# Patient Record
Sex: MALE | Race: WHITE | URBAN - METROPOLITAN AREA
[De-identification: names, ages, dates, MRNs, and addresses within clinical notes are randomized per-mention and may not be internally consistent; named-entity substitution may affect disease eponyms.]

---

## 2018-03-02 DIAGNOSIS — Z13.6 SCREENING FOR CARDIOVASCULAR CONDITION: Primary | ICD-10-CM

## 2018-03-05 ENCOUNTER — OFFICE VISIT (OUTPATIENT)
Dept: CARDIOLOGY | Facility: CLINIC | Age: 63
End: 2018-03-05
Payer: COMMERCIAL

## 2018-03-05 VITALS
HEART RATE: 63 BPM | HEIGHT: 68 IN | BODY MASS INDEX: 28.49 KG/M2 | DIASTOLIC BLOOD PRESSURE: 77 MMHG | SYSTOLIC BLOOD PRESSURE: 176 MMHG | WEIGHT: 188 LBS | OXYGEN SATURATION: 99 %

## 2018-03-05 DIAGNOSIS — I10 ESSENTIAL HYPERTENSION: Primary | ICD-10-CM

## 2018-03-05 DIAGNOSIS — Z82.49 FAMILY HISTORY OF CORONARY ARTERIOSCLEROSIS: ICD-10-CM

## 2018-03-05 DIAGNOSIS — I65.23 ATHEROSCLEROSIS OF BOTH CAROTID ARTERIES: ICD-10-CM

## 2018-03-05 DIAGNOSIS — Z13.6 SCREENING FOR CARDIOVASCULAR CONDITION: ICD-10-CM

## 2018-03-05 LAB
BUN SERPL-MCNC: 19 MG/DL (ref 7–30)
CHOLEST SERPL-MCNC: 170 MG/DL
CREAT SERPL-MCNC: 0.89 MG/DL (ref 0.66–1.25)
CREAT UR-MCNC: 252 MG/DL
FEF 25/75: NORMAL
FEV-1: NORMAL
FEV1/FVC: NORMAL
FVC: NORMAL
GFR SERPL CREATININE-BSD FRML MDRD: 86 ML/MIN/1.7M2
GLUCOSE SERPL-MCNC: 99 MG/DL (ref 70–99)
HDLC SERPL-MCNC: 54 MG/DL
LDLC SERPL CALC-MCNC: 99 MG/DL
MICROALBUMIN UR-MCNC: 38 MG/L
MICROALBUMIN/CREAT UR: 15.08 MG/G CR (ref 0–17)
NONHDLC SERPL-MCNC: 116 MG/DL
NT-PROBNP SERPL-MCNC: 87 PG/ML (ref 0–125)
TRIGL SERPL-MCNC: 84 MG/DL

## 2018-03-05 NOTE — LETTER
3/5/2018      RE: Bashir Virk  [address] [facility] [address]       Dear Colleague,    Thank you for the opportunity to participate in the care of your patient, Bashir Virk, at the [facility] FOR CARDIOVASCULAR DISEASE PREVENTION at [facility]. Please see a copy of my visit note below.    [facility] for Cardiovascular Disease Prevention - Exam Note    Active Problems   Patient Active Problem List    Diagnosis Date Noted     Elevated blood pressure reading      Priority: Medium       Reason For Visit   Patient here for [facility] early detection of atherosclerosis and CVD exam.    Pain Evaluation  Current history of pain associated with this visit is: denied    HPI   Bashir Virk is a 62 year old year old male with a history of isolated elevated blood pressures in the 140's systolic and family history of coronary disease with his sister age 65 with stent placement and father age 67 wit sudden cardiac death. He also has many relatives that have lived into their 90's. He has not seen a doctor in many years but has checked his blood pressure at drug stores with elevated readings in the 140's.  He feels well with no chest pain or shortness of breath. He does have mild lower leg edema (sock ring) and varicose veins. History of renal calculi and he thinks he passes a stone now and then but they have never been tested. He drinks more liquids if he thinks he is passing a stone (back pain).     Nutrition assessment per patient report:   Foods with fat/cholesterol (fried foods, fatty meats, junk food):  2 servings per week  Red meat, not much fried or fast food.  Fruits and vegetables (  cup cooked, 1 cup raw):2-  3 servings per day , two fruits some veges.  Caffeine (1 cup coffee, soda, etc):  4-6 coffee, light creamer, stevia  Alcohol servings (12 oz. beer, 4 oz. wine, 1  oz. in mixed drink): 1- 2 servings per week  Calcium servings (dairy foods, 8  oz. milk, yogurt, cheese, ice cream):  2 servings per day  Salt/sodium use:  light use  Special dietary habits:  None    Activity  Patient is active recently with  times per week for 30 or more minutes with walking. Since he has recently been walking regularly he has noticed less of an increase in his heart rate with exertion. He also does yard work like mowing,  In the summer.    Laboratory Results Review  We discussed laboratory results today including lipids targets and how foods influence cholesterol.    Weight  His perceived healthy weight is 173 pounds.  A normal BMI of 25 is equal to 164 pounds.  The current BMI of 28.5 is overweight range.  A weight reduction speed of 2-3 lbs per month for men is recommended.    PMH   Past Medical History:   Diagnosis Date     Elbow injury 1996    both arms     Elevated blood pressure reading      H/O tinnitus      Kidney stone 2008     Varicose veins of left lower extremity        PSH  Past Surgical History:   Procedure Laterality Date     HAND SURGERY  1977    reconstruction surgery after crushing injury       Current Meds   No current outpatient prescriptions on file.       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 His job is [employer].  He is  with one son age 21 one daughter age 24..     Enjoyment of life is 8 with 10 enjoys life fully.    Tobacco History  History   Smoking Status     Not on file   Smokeless Tobacco     Not on file       ROS  CONSTITUTIONAL:  No fever, chills, or sweats. No weight gain/loss.   EENT:  No visual disturbance, ear ache, epistaxis, sore throat  ALLERGIES/IMMUNOLOGIC:   "Negative  RESPIRATORY:  No cough, hemoptysis  CARDIOVASCULAR:  As per HPI  GI:  No nausea, vomiting, hematemesis, melena  :  No urinary frequency, dysuria, or hematuria  INTEGUMENT:  Negative  PSYCHIATRIC:  Negative  NEURO:  Negative  ENDOCRINE:  Negative  MUSCULOSKELETAL:  Negative  MUSCULOSKELETAL:  some hand pain from old injury.     Vital Signs   /77 (BP Location: Left arm, Patient Position: Sitting, Cuff Size: Adult Regular)  Pulse 63  Ht 1.727 m (5' 8\")  Wt 85.3 kg (188 lb)  SpO2 99%  BMI 28.59 kg/m2      Waist: 38.5 inches  Hip: 41 inches    Physical Exam   In general, the patient is a pleasant male in no apparent distress.    HEENT: NC/AT.  PERRLA.  EOMI.  Sclerae white, not injected.  Nares clear.  Pharynx without erythema or exudate.  Dentition intact.    Neck: No adenopathy.  No thyromegaly.Carotids +4/4 bilaterally with right sided bruits.  No jugular venous distension.   Lungs: CTA.  No ronchi, wheezes, rales.     Cor: RRR. Normal S1, S2 splits physiologically. Grade 2/6 systolic murmur RICS ? Radiation to right carotid or carotid bruit, no rub, click, or gallop. The PMI is in the 5th ICS in the midclavicular line. There is no heave.   Abdomen: Soft, nontender, nondistended. No organomegaly.  No bruits.   Extremities: No clubbing, cyanosis, mild lower leg edema. The pulses are +2/2 at the post-tibial sites bilaterally.     Recent Labs  Lab Results   Component Value Date    GLC 99 03/05/2018      Lab Results   Component Value Date    NTBNP 87 03/05/2018     No results found for: NTBNPI   Lab Results   Component Value Date    UCRR 252 03/05/2018      Lab Results   Component Value Date    MICROL 38 03/05/2018      No results found for: MICROALBUMIN   No results found for: CRP   Lab Results   Component Value Date    CHOL 170 03/05/2018      Lab Results   Component Value Date    TRIG 84 03/05/2018      Lab Results   Component Value Date    HDL 54 03/05/2018      Lab Results   Component Value " Date    LDL 99 03/05/2018      No results found for: VLDL   No results found for: CHOLHDLRATIO  Lab Results   Component Value Date    NHDL 116 03/05/2018          Shannon Test Results    BASIC SPIROMETRY: Summary of two attempts (see printout for details of results)  Results Estimated range for ht/age   FVC: 4.16 liter FVC: 3.60-5.41 liter   FEV1: 3.59 liter FEV1: 2.62-4.15 liter     History of asthma:  NO   History of respiratory infection current/recent:  NO     Spirometry Results:  normal      WALKING BLOOD PRESSURE RESPONSE (3 minute, 5 MET level walk)   Pre BP: 140/76 mmHg  3 min BP: 220/60 mmHg  1 min post BP: 180/70 mmHg    Pre HR: 70 bpm  3 min HR: 140 bpm  1 min post HR: 100 bpm       RETINAL VASCULAR ASSESSMENT   Left Eye Abnormality:  siver wiring  AV Ratio: 0.60    Right Eye Abnormality:  siver wiring  AV Ratio: 0.58     Retinal Assessment:  abnormal      ABDOMINAL AORTA ULTRASOUND (< 2.5 normal, borderline 2.5-2.9, abnormal > 3)   SupraIliac 2.15 cm    SupraRenal 2.08 cm    InfraRenal Proximal 2.12 cm    InfraRenal Distal 2.04 cm      Abdominal Aorta Assessment:  normal      LEFT VENTRICULAR ULTRASOUND MEASUREMENTS (adjusted for BSA)  LVIDD 41.0 mm   Septa 13.4 mm   Posterior 15.2 mm     Left Ventricular US Assessment:  abnormal      Carotid Artery IMT measurements report and plaques in the small area examined:   Left IMT 0.889 mm  Plaques multiple plaques in CCA and bulb area size 3.0 mm - 1.00 mm     Right IMT 1.084 mm  Plaques multiple plaques in CCA and bulb area size 3.0 mm - 1.00 mm       ECG (see tracing):  normal sinus rhythm;  rate: 60 bpm      Arterial Elasticity per age and gender (see printout):   C1 11.4 mL/mmHg x 10  normal   C2 2.4 mL/mmHg x 100 abnormal   Supine blood pressure: 157/81 mmHg       Assessment:   11.4  Cardiovascular:  ECG sinus rhythm rate ___. No chest pain or shortness of breath, palpitations or dizziness. He has mild lower leg edema. Grade 2/6 systolic murmur and right  sided carotid bruit.  Nt pro BNP is normal range, no report of sleep apnea. We discussed possible recommendation for low dose aspirin, he does have some tinnitus so will follow up with recommendation based on his test results. We discussed obtaining a CAC scan with family history of CAD in the 60's to screen for advanced disease.     Blood Pressure:  Elevated resting blood pressures today from 150-170's/ 70-80's. He takes no medications and has recorded elevated out of clinic blood pressures with drug store devices.    Lipids: HDL 54, LDL 99, trig 84 without elevation. If he has significant carotid plaque or reduced compliance may still recommend statin to treat disease even with optimal numbers depending on team review of data.    Health Habit Summary:  Nutrition: Heart Healthy Eating:  some of the time   Exercise:  regularly active  Weight:  overweight range  Tobacco Use:  20 pack year history quit in 1991    Full report to follow prevention team review of test results with scanned final report.    Time spent for patient visit was 60 minutes with more than half the time spent on counseling and coordination of care.    YOANNA Santillan CNP       CC  Patient Care Team:  No Ref-Primary, Physician as PCP - General  Nini Bonilla APRN CNP as Nurse Practitioner (Nurse Practitioner)  SELF, REFERRED    Answers for HPI/ROS submitted by the patient on 3/5/2018   General Symptoms: No  Skin Symptoms: No  HENT Symptoms: No  EYE SYMPTOMS: No  HEART SYMPTOMS: No  LUNG SYMPTOMS: No  INTESTINAL SYMPTOMS: No  URINARY SYMPTOMS: No  REPRODUCTIVE SYMPTOMS: No  SKELETAL SYMPTOMS: No  BLOOD SYMPTOMS: No  NERVOUS SYSTEM SYMPTOMS: No  MENTAL HEALTH SYMPTOMS: No      Please do not hesitate to contact me if you have any questions/concerns.     Sincerely,     YOANNA Santillan CNP

## 2018-03-05 NOTE — PROGRESS NOTES
[facility] for Cardiovascular Disease Prevention - Exam Note    Active Problems   Patient Active Problem List    Diagnosis Date Noted     Elevated blood pressure reading      Priority: Medium       Reason For Visit   Patient here for [facility] early detection of atherosclerosis and CVD exam.    Pain Evaluation  Current history of pain associated with this visit is: denied    HPI   Bashir Virk is a 62 year old year old male with a history of isolated elevated blood pressures in the 140's systolic and family history of coronary disease with his sister age 65 with stent placement and father age 67 wit sudden cardiac death. He also has many relatives that have lived into their 90's. He has not seen a doctor in many years but has checked his blood pressure at drug stores with elevated readings in the 140's.  He feels well with no chest pain or shortness of breath. He does have mild lower leg edema (sock ring) and varicose veins. History of renal calculi and he thinks he passes a stone now and then but they have never been tested. He drinks more liquids if he thinks he is passing a stone (back pain).     Nutrition assessment per patient report:   Foods with fat/cholesterol (fried foods, fatty meats, junk food):  2 servings per week  Red meat, not much fried or fast food.  Fruits and vegetables (  cup cooked, 1 cup raw):2-  3 servings per day , two fruits some veges.  Caffeine (1 cup coffee, soda, etc):  4-6 coffee, light creamer, stevia  Alcohol servings (12 oz. beer, 4 oz. wine, 1  oz. in mixed drink): 1- 2 servings per week  Calcium servings (dairy foods, 8 oz. milk, yogurt, cheese, ice cream):  2 servings per day  Salt/sodium use:  light use  Special dietary habits:  None    Activity  Patient is active recently with  times per week for 30 or more minutes with walking. Since he has recently been walking regularly he has noticed less of an increase in his heart rate with exertion. He also does yard work like  mowing,  In the summer.    Laboratory Results Review  We discussed laboratory results today including lipids targets and how foods influence cholesterol.    Weight  His perceived healthy weight is 173 pounds.  A normal BMI of 25 is equal to 164 pounds.  The current BMI of 28.5 is overweight range.  A weight reduction speed of 2-3 lbs per month for men is recommended.    PMH   Past Medical History:   Diagnosis Date     Elbow injury 1996    both arms     Elevated blood pressure reading      H/O tinnitus      Kidney stone 2008     Varicose veins of left lower extremity        PSH  Past Surgical History:   Procedure Laterality Date     HAND SURGERY  1977    reconstruction surgery after crushing injury       Current Meds   No current outpatient prescriptions on file.       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  He is  with one son age 21 one daughter age 24..     Enjoyment of life is 8 with 10 enjoys life fully.    Tobacco History  History   Smoking Status     Not on file   Smokeless Tobacco     Not on file       ROS  CONSTITUTIONAL:  No fever, chills, or sweats. No weight gain/loss.   EENT:  No visual disturbance, ear ache, epistaxis, sore throat  ALLERGIES/IMMUNOLOGIC:  Negative  RESPIRATORY:  No cough, hemoptysis  CARDIOVASCULAR:  As per HPI  GI:  No nausea, vomiting, hematemesis, melena  :  No urinary frequency, dysuria, or hematuria  INTEGUMENT:  Negative  PSYCHIATRIC:  Negative  NEURO:  Negative  ENDOCRINE:  Negative  MUSCULOSKELETAL:  Negative  MUSCULOSKELETAL:  some hand pain from old injury.     Vital Signs   /77 (BP  "Location: Left arm, Patient Position: Sitting, Cuff Size: Adult Regular)  Pulse 63  Ht 1.727 m (5' 8\")  Wt 85.3 kg (188 lb)  SpO2 99%  BMI 28.59 kg/m2      Waist: 38.5 inches  Hip: 41 inches    Physical Exam   In general, the patient is a pleasant male in no apparent distress.    HEENT: NC/AT.  PERRLA.  EOMI.  Sclerae white, not injected.  Nares clear.  Pharynx without erythema or exudate.  Dentition intact.    Neck: No adenopathy.  No thyromegaly.Carotids +4/4 bilaterally with right sided bruits.  No jugular venous distension.   Lungs: CTA.  No ronchi, wheezes, rales.     Cor: RRR. Normal S1, S2 splits physiologically. Grade 2/6 systolic murmur RICS ? Radiation to right carotid or carotid bruit, no rub, click, or gallop. The PMI is in the 5th ICS in the midclavicular line. There is no heave.   Abdomen: Soft, nontender, nondistended. No organomegaly.  No bruits.   Extremities: No clubbing, cyanosis, mild lower leg edema. The pulses are +2/2 at the post-tibial sites bilaterally.     Recent Labs  Lab Results   Component Value Date    GLC 99 03/05/2018      Lab Results   Component Value Date    NTBNP 87 03/05/2018     No results found for: NTBNPI   Lab Results   Component Value Date    UCRR 252 03/05/2018      Lab Results   Component Value Date    MICROL 38 03/05/2018      No results found for: MICROALBUMIN   No results found for: CRP   Lab Results   Component Value Date    CHOL 170 03/05/2018      Lab Results   Component Value Date    TRIG 84 03/05/2018      Lab Results   Component Value Date    HDL 54 03/05/2018      Lab Results   Component Value Date    LDL 99 03/05/2018      No results found for: VLDL   No results found for: CHOLHDLRATIO  Lab Results   Component Value Date    NHDL 116 03/05/2018          Shannon Test Results    BASIC SPIROMETRY: Summary of two attempts (see printout for details of results)  Results Estimated range for ht/age   FVC: 4.16 liter FVC: 3.60-5.41 liter   FEV1: 3.59 liter FEV1: " 2.62-4.15 liter     History of asthma:  NO   History of respiratory infection current/recent:  NO     Spirometry Results:  normal      WALKING BLOOD PRESSURE RESPONSE (3 minute, 5 MET level walk)   Pre BP: 140/76 mmHg  3 min BP: 220/60 mmHg  1 min post BP: 180/70 mmHg    Pre HR: 70 bpm  3 min HR: 140 bpm  1 min post HR: 100 bpm       RETINAL VASCULAR ASSESSMENT   Left Eye Abnormality:  siver wiring  AV Ratio: 0.60    Right Eye Abnormality:  siver wiring  AV Ratio: 0.58     Retinal Assessment:  abnormal      ABDOMINAL AORTA ULTRASOUND (< 2.5 normal, borderline 2.5-2.9, abnormal > 3)   SupraIliac 2.15 cm    SupraRenal 2.08 cm    InfraRenal Proximal 2.12 cm    InfraRenal Distal 2.04 cm      Abdominal Aorta Assessment:  normal      LEFT VENTRICULAR ULTRASOUND MEASUREMENTS (adjusted for BSA)  LVIDD 41.0 mm   Septa 13.4 mm   Posterior 15.2 mm     Left Ventricular US Assessment:  abnormal      Carotid Artery IMT measurements report and plaques in the small area examined:   Left IMT 0.889 mm  Plaques multiple plaques in CCA and bulb area size 3.0 mm - 1.00 mm     Right IMT 1.084 mm  Plaques multiple plaques in CCA and bulb area size 3.0 mm - 1.00 mm       ECG (see tracing):  normal sinus rhythm;  rate: 60 bpm      Arterial Elasticity per age and gender (see printout):   C1 11.4 mL/mmHg x 10  normal   C2 2.4 mL/mmHg x 100 abnormal   Supine blood pressure: 157/81 mmHg       Assessment:   11.4  Cardiovascular:  ECG sinus rhythm rate ___. No chest pain or shortness of breath, palpitations or dizziness. He has mild lower leg edema. Grade 2/6 systolic murmur and right sided carotid bruit.  Nt pro BNP is normal range, no report of sleep apnea. We discussed possible recommendation for low dose aspirin, he does have some tinnitus so will follow up with recommendation based on his test results. We discussed obtaining a CAC scan with family history of CAD in the 60's to screen for advanced disease.     Blood Pressure:  Elevated  resting blood pressures today from 150-170's/ 70-80's. He takes no medications and has recorded elevated out of clinic blood pressures with drug store devices.    Lipids: HDL 54, LDL 99, trig 84 without elevation. If he has significant carotid plaque or reduced compliance may still recommend statin to treat disease even with optimal numbers depending on team review of data.    Health Habit Summary:  Nutrition: Heart Healthy Eating:  some of the time   Exercise:  regularly active  Weight:  overweight range  Tobacco Use:  20 pack year history quit in 1991    Full report to follow prevention team review of test results with scanned final report.    Time spent for patient visit was 60 minutes with more than half the time spent on counseling and coordination of care.    YOANNA Santillan CNP       CC  Patient Care Team:  No Ref-Primary, Physician as PCP - General  Nini Bonilla APRN CNP as Nurse Practitioner (Nurse Practitioner)  SELF, REFERRED    Answers for HPI/ROS submitted by the patient on 3/5/2018   General Symptoms: No  Skin Symptoms: No  HENT Symptoms: No  EYE SYMPTOMS: No  HEART SYMPTOMS: No  LUNG SYMPTOMS: No  INTESTINAL SYMPTOMS: No  URINARY SYMPTOMS: No  REPRODUCTIVE SYMPTOMS: No  SKELETAL SYMPTOMS: No  BLOOD SYMPTOMS: No  NERVOUS SYSTEM SYMPTOMS: No  MENTAL HEALTH SYMPTOMS: No

## 2018-03-05 NOTE — LETTER
Date:March 26, 2018      Patient was self referred, no letter generated. Do not send.        [facility] Information

## 2018-03-06 ENCOUNTER — TELEPHONE (OUTPATIENT)
Dept: CARDIOLOGY | Facility: CLINIC | Age: 63
End: 2018-03-06

## 2018-03-06 LAB
CRP SERPL HS-MCNC: 8.8 MG/L
INTERPRETATION ECG - MUSE: NORMAL

## 2018-03-06 NOTE — TELEPHONE ENCOUNTER
Discussed blood pressure management with patient with recommendation for amlodipine/benazepril 2.5/10 mg per day and atorvastatin 10 mg per day for treatment of carotid plaques and low dose asa 81 mg per day as recommended by [facility] prevention team. He feels these results were unexpected and wants time to think about his management and declines medication at this time. He is encouraged to follow up with an internist to get a second opinion and also to obtain a BP cuff at home to monitor his blood pressure. He did agree to obtain a CAC study at [facility] to help assess his risk and coronary arteries with strong family hx of CAD and normal lipid values with plaque formation will also obtain a Lpa to see if this is a factor in disease development.

## 2018-03-08 LAB — LPA SERPL-MCNC: 24 MG/DL (ref 0–30)

## 2019-10-02 ENCOUNTER — HEALTH MAINTENANCE LETTER (OUTPATIENT)
Age: 64
End: 2019-10-02

## 2019-12-16 ENCOUNTER — HEALTH MAINTENANCE LETTER (OUTPATIENT)
Age: 64
End: 2019-12-16

## 2021-01-15 ENCOUNTER — HEALTH MAINTENANCE LETTER (OUTPATIENT)
Age: 66
End: 2021-01-15

## 2021-09-05 ENCOUNTER — HEALTH MAINTENANCE LETTER (OUTPATIENT)
Age: 66
End: 2021-09-05

## 2022-02-19 ENCOUNTER — HEALTH MAINTENANCE LETTER (OUTPATIENT)
Age: 67
End: 2022-02-19

## 2022-10-22 ENCOUNTER — HEALTH MAINTENANCE LETTER (OUTPATIENT)
Age: 67
End: 2022-10-22

## 2023-04-01 ENCOUNTER — HEALTH MAINTENANCE LETTER (OUTPATIENT)
Age: 68
End: 2023-04-01